# Patient Record
Sex: FEMALE | URBAN - METROPOLITAN AREA
[De-identification: names, ages, dates, MRNs, and addresses within clinical notes are randomized per-mention and may not be internally consistent; named-entity substitution may affect disease eponyms.]

---

## 2017-08-06 ENCOUNTER — HOSPITAL ENCOUNTER (EMERGENCY)
Facility: OTHER | Age: 47
Discharge: HOME | End: 2017-08-06
Attending: EMERGENCY MEDICINE

## 2017-08-06 VITALS
WEIGHT: 120 LBS | OXYGEN SATURATION: 100 % | DIASTOLIC BLOOD PRESSURE: 89 MMHG | HEIGHT: 60 IN | HEART RATE: 84 BPM | TEMPERATURE: 99 F | BODY MASS INDEX: 23.56 KG/M2 | SYSTOLIC BLOOD PRESSURE: 124 MMHG | RESPIRATION RATE: 18 BRPM

## 2017-08-06 DIAGNOSIS — F10.920 ALCOHOL INTOXICATION, UNCOMPLICATED: Primary | ICD-10-CM

## 2017-08-06 LAB
ETHANOL SERPL-MCNC: 148 MG/DL
POCT GLUCOSE: 100 MG/DL (ref 70–110)

## 2017-08-06 PROCEDURE — 96374 THER/PROPH/DIAG INJ IV PUSH: CPT

## 2017-08-06 PROCEDURE — 96361 HYDRATE IV INFUSION ADD-ON: CPT

## 2017-08-06 PROCEDURE — 82962 GLUCOSE BLOOD TEST: CPT

## 2017-08-06 PROCEDURE — 63600175 PHARM REV CODE 636 W HCPCS: Performed by: EMERGENCY MEDICINE

## 2017-08-06 PROCEDURE — 80320 DRUG SCREEN QUANTALCOHOLS: CPT

## 2017-08-06 PROCEDURE — 25000003 PHARM REV CODE 250: Performed by: EMERGENCY MEDICINE

## 2017-08-06 PROCEDURE — 99284 EMERGENCY DEPT VISIT MOD MDM: CPT | Mod: 25

## 2017-08-06 RX ORDER — ONDANSETRON 2 MG/ML
4 INJECTION INTRAMUSCULAR; INTRAVENOUS
Status: COMPLETED | OUTPATIENT
Start: 2017-08-06 | End: 2017-08-06

## 2017-08-06 RX ADMIN — ONDANSETRON 4 MG: 2 INJECTION INTRAMUSCULAR; INTRAVENOUS at 05:08

## 2017-08-06 RX ADMIN — SODIUM CHLORIDE 1000 ML: 0.9 INJECTION, SOLUTION INTRAVENOUS at 05:08

## 2017-08-06 NOTE — ED NOTES
Pt laying in bed resting quietly; responds appropriately to verbal stimuli; denies any pain; no s/s of distress; respirations regular, even and unlabored; bathroom needs addressed; denies any wants or needs at this time; bed in lowest position with side rails up x2; personal items and call light within reach; continue to monitor for any changes

## 2017-08-06 NOTE — ED NOTES
"Pt from out of town, brought to ED via EMS for alcohol intoxication. Pt and pt family member reporting pt may "have had something slipped into my drink." Pt very friendly with ED staff, speaking in loud voice, tearful at times. Pt AAOx4 and appropriate at this time. Respirations even and unlabored. No acute distress noted. Pt denies pain. Awaiting further orders. Pt updated on POC. Bed is locked and in lowest position with side rails up x2. Call bell within reach and pt oriented to use of call bell. Pt continuous pulse ox, and continuous BP cuff. Will continue to monitor.     "

## 2017-08-06 NOTE — ED PROVIDER NOTES
Encounter Date: 8/6/2017    SCRIBE #1 NOTE: I, Francisco Pedroza, am scribing for, and in the presence of,  Dr. Chaves. I have scribed the entire note.       History     Chief Complaint   Patient presents with    Alcohol Intoxication     pt was on [place] with unsteady gait; + ETOH; awake and tearful at this time     Time patient was seen by the provider: 5:10 AM      The patient is a 46 y.o. Female who presents to the ED with a complaint of alcohol intoxication. Patient was reported walking around the [place] intoxicated with an altered gait. She reports going out to [employer] where she had a few drinks. The patient was found by police crying. She called a relative who went to [place].        The history is provided by the patient.     Review of patient's allergies indicates:  No Known Allergies  No past medical history on file.  No past surgical history on file.  No family history on file.  Social History   Substance Use Topics    Smoking status: Not on file    Smokeless tobacco: Not on file    Alcohol use Not on file     Review of Systems   Constitutional: Negative for fever.   HENT: Negative for sore throat.    Respiratory: Negative for shortness of breath.    Cardiovascular: Negative for chest pain.   Gastrointestinal: Negative for nausea.   Genitourinary: Negative for dysuria.   Musculoskeletal: Negative for back pain.   Skin: Negative for rash.   Neurological: Negative for weakness.   Hematological: Does not bruise/bleed easily.   Psychiatric/Behavioral: Positive for agitation and confusion.       Physical Exam     Initial Vitals [08/06/17 0500]   BP Pulse Resp Temp SpO2   (!) 129/98 86 16 98.5 °F (36.9 °C) 100 %      MAP       108.33         Physical Exam    Nursing note and vitals reviewed.  Constitutional: She appears well-developed.   EtOH on breath   HENT:   Head: Normocephalic and atraumatic.   Mouth/Throat: Oropharynx is clear and moist.   Eyes: Conjunctivae are normal.   Neck: Neck  supple.   Cardiovascular: Normal rate, regular rhythm, normal heart sounds and intact distal pulses. Exam reveals no gallop and no friction rub.    No murmur heard.  Pulmonary/Chest: Breath sounds normal. She has no wheezes. She has no rhonchi. She has no rales.   Abdominal: Soft. She exhibits no distension. There is no tenderness.   Musculoskeletal: Normal range of motion.   Neurological: She is alert and oriented to person, place, and time.   Skin: No rash noted. No erythema.   Psychiatric: She has a normal mood and affect.         ED Course   Procedures  Labs Reviewed   ALCOHOL,MEDICAL (ETHANOL)   POCT GLUCOSE   POCT GLUCOSE MONITORING CONTINUOUS                        Scribe Attestation:   Scribe #1: I performed the above scribed service and the documentation accurately describes the services I performed. I attest to the accuracy of the note.    Attending Attestation:           Physician Attestation for Scribe:  Physician Attestation Statement for Scribe #1: I, Dr. Olmos, reviewed documentation, as scribed by in my presence, and it is both accurate and complete.         Attending ED Notes:   Urgent evaluation a 46-year-old female with alcohol intoxication.  Patient is afebrile, nontoxic-appearing with stable vital signs.  Alcohol level is 148.  Blood glucose is 100.  Patient denies trauma.  No clinical signs of trauma.  Physical exam is benign.  Patient is observed for sobriety.  On discharge patient is alert and oriented ×4, without evidence of conduct patient, slurred speech, nystagmus or altered gait.  GCS of 15.  The patient is extensively counseled on her diagnosis and treatment, discharged in good condition and directed to follow-up with her PCP in the next 24-48 hours.          ED Course     Clinical Impression:     1. Alcohol intoxication, uncomplicated                               Gama Olmos MD  08/06/17 0711